# Patient Record
Sex: FEMALE | Race: WHITE | NOT HISPANIC OR LATINO | URBAN - NONMETROPOLITAN AREA
[De-identification: names, ages, dates, MRNs, and addresses within clinical notes are randomized per-mention and may not be internally consistent; named-entity substitution may affect disease eponyms.]

---

## 2024-04-01 ENCOUNTER — APPOINTMENT (RX ONLY)
Dept: URBAN - NONMETROPOLITAN AREA CLINIC 12 | Facility: CLINIC | Age: 39
Setting detail: DERMATOLOGY
End: 2024-04-01

## 2024-04-01 DIAGNOSIS — L98.8 OTHER SPECIFIED DISORDERS OF THE SKIN AND SUBCUTANEOUS TISSUE: ICD-10-CM

## 2024-04-01 DIAGNOSIS — I83.9 ASYMPTOMATIC VARICOSE VEINS OF LOWER EXTREMITIES: ICD-10-CM

## 2024-04-01 PROBLEM — I83.92 ASYMPTOMATIC VARICOSE VEINS OF LEFT LOWER EXTREMITY: Status: ACTIVE | Noted: 2024-04-01

## 2024-04-01 PROCEDURE — 99203 OFFICE O/P NEW LOW 30 MIN: CPT

## 2024-04-01 PROCEDURE — ? PRESCRIPTION MEDICATION MANAGEMENT

## 2024-04-01 PROCEDURE — ? REFERRAL

## 2024-04-01 PROCEDURE — ? PRESCRIPTION

## 2024-04-01 PROCEDURE — ? ADDITIONAL NOTES

## 2024-04-01 PROCEDURE — ? COUNSELING

## 2024-04-01 RX ORDER — TRETIONIN 0.25 MG/G
CREAM TOPICAL QHS
Qty: 45 | Refills: 3 | Status: ERX | COMMUNITY
Start: 2024-04-01

## 2024-04-01 RX ADMIN — TRETIONIN: 0.25 CREAM TOPICAL at 00:00

## 2024-04-01 ASSESSMENT — LOCATION DETAILED DESCRIPTION DERM: LOCATION DETAILED: LEFT POPLITEAL SKIN

## 2024-04-01 ASSESSMENT — LOCATION ZONE DERM: LOCATION ZONE: LEG

## 2024-04-01 ASSESSMENT — LOCATION SIMPLE DESCRIPTION DERM: LOCATION SIMPLE: LEFT POPLITEAL SKIN

## 2024-04-01 NOTE — PROCEDURE: ADDITIONAL NOTES
Render Risk Assessment In Note?: no
Detail Level: Simple
Additional Notes: Referred onto vein specialist.

## 2024-04-01 NOTE — PROCEDURE: PRESCRIPTION MEDICATION MANAGEMENT
Render In Strict Bullet Format?: No
Initiate Treatment: Tretinoin 0.025%  apply pea sized amount on face nightly. Start every other night working to nightly tolerated.
Detail Level: Generalized

## 2024-04-25 ENCOUNTER — APPOINTMENT (RX ONLY)
Dept: URBAN - NONMETROPOLITAN AREA CLINIC 12 | Facility: CLINIC | Age: 39
Setting detail: DERMATOLOGY
End: 2024-04-25

## 2024-04-25 DIAGNOSIS — Z41.9 ENCOUNTER FOR PROCEDURE FOR PURPOSES OTHER THAN REMEDYING HEALTH STATE, UNSPECIFIED: ICD-10-CM | Status: INADEQUATELY CONTROLLED

## 2024-04-25 PROCEDURE — ? OTHER

## 2024-04-25 PROCEDURE — ? OTC TREATMENT REGIMEN

## 2024-04-25 PROCEDURE — ? COSMETIC CONSULTATION: SCLEROTHERAPY

## 2024-04-25 PROCEDURE — ? PHOTO-DOCUMENTATION

## 2024-04-25 ASSESSMENT — LOCATION DETAILED DESCRIPTION DERM: LOCATION DETAILED: LEFT DISTAL POSTERIOR THIGH

## 2024-04-25 ASSESSMENT — LOCATION SIMPLE DESCRIPTION DERM: LOCATION SIMPLE: LEFT POSTERIOR THIGH

## 2024-04-25 ASSESSMENT — LOCATION ZONE DERM: LOCATION ZONE: LEG

## 2024-04-25 NOTE — PROCEDURE: OTC TREATMENT REGIMEN
Patient Specific Otc Recommendations (Will Not Stick From Patient To Patient): Dermaka cream 2- 3x daily PRN s/p procedures
Detail Level: Zone

## 2024-04-25 NOTE — HPI: VEIN EVALUATION
[id number]
Which Leg Is Worse?: Left
Do You Have A Family History Of Bleeding Disorders?: no
How Severe Is/Are Your Symptoms?: mild
Is This A New Presentation, Or A Follow-Up?: Vein Evaluation
Additional History: Four pregnancies
Family History Of Vein Disease (Include Family Member And Type Of Vein Disease):: Mother
Referred By:: In office advertising
Patient's Profession?:

## 2024-04-25 NOTE — PROCEDURE: COSMETIC CONSULTATION: SCLEROTHERAPY
Send Procedure Quote As Charge: No
Procedure Quote $ (Will Render In Note. Use Numbers Only, No Text Please.): 300
Detail Level: Detailed

## 2024-04-25 NOTE — PROCEDURE: OTHER
Detail Level: Zone
Other (Free Text): - Also discussed VeinGogh as a treatment option for the s smaller veins.\\n\\n- Advised to wear compression stockings on a regular basis. She states she has some new medical grade stockings at home.
Note Text (......Xxx Chief Complaint.): This diagnosis correlates with the
Render Risk Assessment In Note?: no